# Patient Record
Sex: FEMALE | Race: WHITE
[De-identification: names, ages, dates, MRNs, and addresses within clinical notes are randomized per-mention and may not be internally consistent; named-entity substitution may affect disease eponyms.]

---

## 2018-06-21 ENCOUNTER — RECORDS - HEALTHEAST (OUTPATIENT)
Dept: ADMINISTRATIVE | Facility: OTHER | Age: 38
End: 2018-06-21

## 2018-06-21 LAB
LAB AP CHARGES (HE HISTORICAL CONVERSION): NORMAL
PATH REPORT.ADDENDUM SPEC: NORMAL
PATH REPORT.COMMENTS IMP SPEC: NORMAL
PATH REPORT.FINAL DX SPEC: NORMAL
PATH REPORT.GROSS SPEC: NORMAL
PATH REPORT.MICROSCOPIC SPEC OTHER STN: NORMAL
PATH REPORT.RELEVANT HX SPEC: NORMAL
RESULT FLAG (HE HISTORICAL CONVERSION): NORMAL

## 2018-07-30 ENCOUNTER — TRANSFERRED RECORDS (OUTPATIENT)
Dept: HEALTH INFORMATION MANAGEMENT | Facility: CLINIC | Age: 38
End: 2018-07-30

## 2018-07-31 ENCOUNTER — TRANSFERRED RECORDS (OUTPATIENT)
Dept: HEALTH INFORMATION MANAGEMENT | Facility: CLINIC | Age: 38
End: 2018-07-31

## 2018-08-02 ENCOUNTER — TRANSFERRED RECORDS (OUTPATIENT)
Dept: HEALTH INFORMATION MANAGEMENT | Facility: CLINIC | Age: 38
End: 2018-08-02

## 2018-08-04 ENCOUNTER — TRANSFERRED RECORDS (OUTPATIENT)
Dept: HEALTH INFORMATION MANAGEMENT | Facility: CLINIC | Age: 38
End: 2018-08-04

## 2018-08-06 ENCOUNTER — TRANSFERRED RECORDS (OUTPATIENT)
Dept: HEALTH INFORMATION MANAGEMENT | Facility: CLINIC | Age: 38
End: 2018-08-06

## 2018-08-17 ENCOUNTER — TRANSFERRED RECORDS (OUTPATIENT)
Dept: HEALTH INFORMATION MANAGEMENT | Facility: CLINIC | Age: 38
End: 2018-08-17

## 2018-08-22 ENCOUNTER — TRANSFERRED RECORDS (OUTPATIENT)
Dept: HEALTH INFORMATION MANAGEMENT | Facility: CLINIC | Age: 38
End: 2018-08-22

## 2018-08-22 ENCOUNTER — MEDICAL CORRESPONDENCE (OUTPATIENT)
Dept: HEALTH INFORMATION MANAGEMENT | Facility: CLINIC | Age: 38
End: 2018-08-22

## 2018-08-24 ENCOUNTER — TELEPHONE (OUTPATIENT)
Dept: GASTROENTEROLOGY | Facility: CLINIC | Age: 38
End: 2018-08-24

## 2018-08-24 DIAGNOSIS — R10.13 ABDOMINAL PAIN, EPIGASTRIC: Primary | ICD-10-CM

## 2018-08-24 NOTE — TELEPHONE ENCOUNTER
[facility] Intake form:    Referring/Requesting provider and Health care System: Chanda Morillo NP from [facility] contact - Name, N/A Phone [phone] Fax number: [phone]    Requested provider (if specified): Moris    Has patient been evaluated in clinic or had a procedure [facility] provider in the last 5 years: no (was previously scheduled 2013 but canceled)     Indication/Diagnosis for consultation: SOD dysfunction    Is diagnosis on list of approved diagnosis: yes    Has patient been evaluated by another Gastroenterologist? Yes at [facility], included in recs    Imaging completed:     CT scan     yes  MRI         no          Procedures:     Upper Endoscopy/EGD   yes    Endoscopic Ultrasound/EUS no    ERCP      no    Colonoscopy    no      Are images able/being pushed to our system? ys       Is patient aware of request for clinc consultation and ok to be contacted to schedule? yes    Inform referring clinic of the following and provided fax number to:  [phone] emailed to Alison

## 2018-08-27 NOTE — TELEPHONE ENCOUNTER
[facility]       Referred to: [facility]     Provider Requested: Moris     Referral Received: 8/24/2018     Records received: 8/28/2018     Images received: 8/27/2018 called film to grab images.     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 Subramanian. S/P Sanjana ABARCA review date: 9/19/2018 Reviewed by Dr. Moris ABARCA Decision for clinic consultation/Orders:   1. SMRCP   2. Non-urgent clinic visit.     Order placed and sent to scheduling.         Referral updates/Patient contacted:   9/19/2018: called Jazmin and left message advising of plan for MRCP and that scheduling will reach out to her to schedule. Asked her to call this RN back if she cannot do MRI for any reason like metal in her body or if she is claustraphobia.   Clinic number left for her to call with questions/concerns.     Cristal BAHENA RN Coordinator  Dr. Subramanian, Dr. Jc & Dr. Ortega   [facility]  [phone]

## 2018-08-28 NOTE — TELEPHONE ENCOUNTER
40 Pages of medical records received.  Hard copy folder created and handed over to RN[facility].     08/28/2018 @ 1005 A

## 2018-09-20 ENCOUNTER — TELEPHONE (OUTPATIENT)
Dept: GASTROENTEROLOGY | Facility: CLINIC | Age: 38
End: 2018-09-20

## 2018-09-20 NOTE — TELEPHONE ENCOUNTER
Called PT. Scheduled appointment. No further action taken. Patient is ok with times and options given.

## 2021-05-29 ENCOUNTER — RECORDS - HEALTHEAST (OUTPATIENT)
Dept: ADMINISTRATIVE | Facility: CLINIC | Age: 41
End: 2021-05-29

## 2021-08-01 ENCOUNTER — HEALTH MAINTENANCE LETTER (OUTPATIENT)
Age: 41
End: 2021-08-01

## 2021-09-26 ENCOUNTER — HEALTH MAINTENANCE LETTER (OUTPATIENT)
Age: 41
End: 2021-09-26

## 2022-08-28 ENCOUNTER — HEALTH MAINTENANCE LETTER (OUTPATIENT)
Age: 42
End: 2022-08-28

## 2023-04-23 ENCOUNTER — HEALTH MAINTENANCE LETTER (OUTPATIENT)
Age: 43
End: 2023-04-23

## 2023-09-24 ENCOUNTER — HEALTH MAINTENANCE LETTER (OUTPATIENT)
Age: 43
End: 2023-09-24

## 2024-02-11 ENCOUNTER — HEALTH MAINTENANCE LETTER (OUTPATIENT)
Age: 44
End: 2024-02-11